# Patient Record
Sex: MALE | Race: WHITE | Employment: OTHER | URBAN - METROPOLITAN AREA
[De-identification: names, ages, dates, MRNs, and addresses within clinical notes are randomized per-mention and may not be internally consistent; named-entity substitution may affect disease eponyms.]

---

## 2019-11-21 ENCOUNTER — OFFICE VISIT (OUTPATIENT)
Dept: URGENT CARE | Facility: URGENT CARE | Age: 74
End: 2019-11-21
Payer: MEDICARE

## 2019-11-21 VITALS
OXYGEN SATURATION: 95 % | TEMPERATURE: 97.7 F | HEIGHT: 71 IN | DIASTOLIC BLOOD PRESSURE: 89 MMHG | BODY MASS INDEX: 33.32 KG/M2 | SYSTOLIC BLOOD PRESSURE: 142 MMHG | WEIGHT: 238 LBS | HEART RATE: 74 BPM

## 2019-11-21 DIAGNOSIS — R42 VERTIGO: Primary | ICD-10-CM

## 2019-11-21 LAB
ANION GAP SERPL CALCULATED.3IONS-SCNC: 3 MMOL/L (ref 3–14)
BUN SERPL-MCNC: 22 MG/DL (ref 7–30)
CALCIUM SERPL-MCNC: 9 MG/DL (ref 8.5–10.1)
CHLORIDE SERPL-SCNC: 110 MMOL/L (ref 94–109)
CO2 SERPL-SCNC: 28 MMOL/L (ref 20–32)
CREAT SERPL-MCNC: 0.73 MG/DL (ref 0.66–1.25)
GFR SERPL CREATININE-BSD FRML MDRD: >90 ML/MIN/{1.73_M2}
GLUCOSE SERPL-MCNC: 90 MG/DL (ref 70–99)
HGB BLD-MCNC: 13.6 G/DL (ref 13.3–17.7)
POTASSIUM SERPL-SCNC: 3.8 MMOL/L (ref 3.4–5.3)
SODIUM SERPL-SCNC: 141 MMOL/L (ref 133–144)
TROPONIN I SERPL-MCNC: <0.015 UG/L (ref 0–0.04)

## 2019-11-21 PROCEDURE — 80048 BASIC METABOLIC PNL TOTAL CA: CPT | Performed by: FAMILY MEDICINE

## 2019-11-21 PROCEDURE — 93000 ELECTROCARDIOGRAM COMPLETE: CPT | Performed by: FAMILY MEDICINE

## 2019-11-21 PROCEDURE — 99204 OFFICE O/P NEW MOD 45 MIN: CPT | Performed by: FAMILY MEDICINE

## 2019-11-21 PROCEDURE — 84484 ASSAY OF TROPONIN QUANT: CPT | Performed by: FAMILY MEDICINE

## 2019-11-21 PROCEDURE — 36415 COLL VENOUS BLD VENIPUNCTURE: CPT | Performed by: FAMILY MEDICINE

## 2019-11-21 PROCEDURE — 85018 HEMOGLOBIN: CPT | Performed by: FAMILY MEDICINE

## 2019-11-21 RX ORDER — MECLIZINE HYDROCHLORIDE 25 MG/1
25 TABLET ORAL 3 TIMES DAILY PRN
Qty: 21 TABLET | Refills: 0 | Status: SHIPPED | OUTPATIENT
Start: 2019-11-21 | End: 2019-11-28

## 2019-11-21 RX ORDER — VALSARTAN AND HYDROCHLOROTHIAZIDE 80; 12.5 MG/1; MG/1
1 TABLET, FILM COATED ORAL DAILY
COMMUNITY
Start: 2019-08-06

## 2019-11-21 ASSESSMENT — MIFFLIN-ST. JEOR: SCORE: 1851.69

## 2019-11-21 NOTE — PROGRESS NOTES
"SUBJECTIVE: Harman Del Castillo is a 74 year old male presenting with a chief complaint of Dizziness/room spinning.  Onset of symptoms was 3 day(s) ago.  Course of illness is worsening.    Severity moderate  Current and Associated symptoms: none  Treatment measures tried include Meclizine 25mg 1/2 tab with each of the 3 episodes the dizziness had occured.  Predisposing factors include none.    Past Medical History:   Diagnosis Date     Hypertension        No past surgical history on file.    Family History   Problem Relation Age of Onset     Cerebrovascular Disease Father      Heart Disease Father        Social History     Tobacco Use     Smoking status: Never Smoker     Smokeless tobacco: Never Used   Substance Use Topics     Alcohol use: Not on file      Allergies no known allergies    Review Of Systems  Skin: negative  Eyes: negative  Ears/Nose/Throat: negative  Respiratory: No shortness of breath, dyspnea on exertion, cough, or hemoptysis  Cardiovascular: negative  Gastrointestinal: negative  Genitourinary: negative  Musculoskeletal: negative  Neurologic: negative for and headaches  Psychiatric: negative    OBJECTIVE:  BP (!) 142/89   Pulse 74   Temp 97.7  F (36.5  C) (Oral)   Ht 1.803 m (5' 11\")   Wt 108 kg (238 lb)   SpO2 95%   BMI 33.19 kg/m     GENERAL APPEARANCE: healthy, alert and no distress  EYES: EOMI,  PERRL, conjunctiva clear  HENT: ear canals and TM's normal.  Nose and mouth without ulcers, erythema or lesions  NECK: supple, nontender, no lymphadenopathy  RESP: lungs clear to auscultation - no rales, rhonchi or wheezes  CV: regular rates and rhythm, normal S1 S2, no murmur noted  ABDOMEN:  soft, nontender, no HSM or masses and bowel sounds normal  NEURO: Normal strength and tone, sensory exam grossly normal,  normal speech and mentation  SKIN: no suspicious lesions or rashes    EKG NSR without acute st changes      ICD-10-CM    1. Vertigo R42 Basic metabolic panel     Troponin I     Hemoglobin     " EKG 12-lead complete w/read - Clinics     meclizine (ANTIVERT) 25 MG tablet     No MI by normal Troponin and EKG

## 2019-11-22 ENCOUNTER — HOSPITAL ENCOUNTER (OUTPATIENT)
Facility: CLINIC | Age: 74
Setting detail: OBSERVATION
Discharge: HOME | End: 2019-11-23
Attending: EMERGENCY MEDICINE | Admitting: HOSPITALIST
Payer: MEDICARE

## 2019-11-22 ENCOUNTER — NURSE TRIAGE (OUTPATIENT)
Dept: NURSING | Facility: CLINIC | Age: 74
End: 2019-11-22

## 2019-11-22 ENCOUNTER — APPOINTMENT (OUTPATIENT)
Dept: MRI IMAGING | Facility: CLINIC | Age: 74
End: 2019-11-22
Attending: EMERGENCY MEDICINE
Payer: MEDICARE

## 2019-11-22 ENCOUNTER — TELEPHONE (OUTPATIENT)
Dept: URGENT CARE | Facility: URGENT CARE | Age: 74
End: 2019-11-22

## 2019-11-22 DIAGNOSIS — R42 VERTIGO: ICD-10-CM

## 2019-11-22 LAB
ANION GAP SERPL CALCULATED.3IONS-SCNC: 9 MMOL/L (ref 3–14)
BASOPHILS # BLD AUTO: 0 10E9/L (ref 0–0.2)
BASOPHILS NFR BLD AUTO: 0.1 %
BUN SERPL-MCNC: 21 MG/DL (ref 7–30)
CALCIUM SERPL-MCNC: 8.4 MG/DL (ref 8.5–10.1)
CHLORIDE SERPL-SCNC: 106 MMOL/L (ref 94–109)
CO2 SERPL-SCNC: 24 MMOL/L (ref 20–32)
CREAT SERPL-MCNC: 0.59 MG/DL (ref 0.66–1.25)
DIFFERENTIAL METHOD BLD: ABNORMAL
EOSINOPHIL # BLD AUTO: 0 10E9/L (ref 0–0.7)
EOSINOPHIL NFR BLD AUTO: 0.1 %
ERYTHROCYTE [DISTWIDTH] IN BLOOD BY AUTOMATED COUNT: 13.8 % (ref 10–15)
GFR SERPL CREATININE-BSD FRML MDRD: >90 ML/MIN/{1.73_M2}
GLUCOSE SERPL-MCNC: 207 MG/DL (ref 70–99)
HBA1C MFR BLD: 5.4 % (ref 0–5.6)
HCT VFR BLD AUTO: 41.6 % (ref 40–53)
HGB BLD-MCNC: 13.9 G/DL (ref 13.3–17.7)
IMM GRANULOCYTES # BLD: 0 10E9/L (ref 0–0.4)
IMM GRANULOCYTES NFR BLD: 0.4 %
LYMPHOCYTES # BLD AUTO: 0.4 10E9/L (ref 0.8–5.3)
LYMPHOCYTES NFR BLD AUTO: 3.8 %
MAGNESIUM SERPL-MCNC: 2 MG/DL (ref 1.6–2.3)
MCH RBC QN AUTO: 31.4 PG (ref 26.5–33)
MCHC RBC AUTO-ENTMCNC: 33.4 G/DL (ref 31.5–36.5)
MCV RBC AUTO: 94 FL (ref 78–100)
MONOCYTES # BLD AUTO: 0.4 10E9/L (ref 0–1.3)
MONOCYTES NFR BLD AUTO: 3.8 %
NEUTROPHILS # BLD AUTO: 9.6 10E9/L (ref 1.6–8.3)
NEUTROPHILS NFR BLD AUTO: 91.8 %
NRBC # BLD AUTO: 0 10*3/UL
NRBC BLD AUTO-RTO: 0 /100
PHOSPHATE SERPL-MCNC: 2.5 MG/DL (ref 2.5–4.5)
PLATELET # BLD AUTO: 307 10E9/L (ref 150–450)
POTASSIUM SERPL-SCNC: 3.6 MMOL/L (ref 3.4–5.3)
RBC # BLD AUTO: 4.43 10E12/L (ref 4.4–5.9)
SODIUM SERPL-SCNC: 139 MMOL/L (ref 133–144)
TROPONIN I SERPL-MCNC: <0.015 UG/L (ref 0–0.04)
TROPONIN I SERPL-MCNC: <0.015 UG/L (ref 0–0.04)
WBC # BLD AUTO: 10.5 10E9/L (ref 4–11)

## 2019-11-22 PROCEDURE — 99285 EMERGENCY DEPT VISIT HI MDM: CPT | Mod: 25

## 2019-11-22 PROCEDURE — 83735 ASSAY OF MAGNESIUM: CPT | Performed by: EMERGENCY MEDICINE

## 2019-11-22 PROCEDURE — 96361 HYDRATE IV INFUSION ADD-ON: CPT

## 2019-11-22 PROCEDURE — A9585 GADOBUTROL INJECTION: HCPCS | Performed by: EMERGENCY MEDICINE

## 2019-11-22 PROCEDURE — 25800030 ZZH RX IP 258 OP 636: Performed by: EMERGENCY MEDICINE

## 2019-11-22 PROCEDURE — 36415 COLL VENOUS BLD VENIPUNCTURE: CPT | Performed by: HOSPITALIST

## 2019-11-22 PROCEDURE — 84484 ASSAY OF TROPONIN QUANT: CPT | Performed by: HOSPITALIST

## 2019-11-22 PROCEDURE — 70553 MRI BRAIN STEM W/O & W/DYE: CPT

## 2019-11-22 PROCEDURE — 84484 ASSAY OF TROPONIN QUANT: CPT | Performed by: EMERGENCY MEDICINE

## 2019-11-22 PROCEDURE — 83036 HEMOGLOBIN GLYCOSYLATED A1C: CPT | Performed by: EMERGENCY MEDICINE

## 2019-11-22 PROCEDURE — 84100 ASSAY OF PHOSPHORUS: CPT | Performed by: EMERGENCY MEDICINE

## 2019-11-22 PROCEDURE — 25000128 H RX IP 250 OP 636: Performed by: EMERGENCY MEDICINE

## 2019-11-22 PROCEDURE — 25800030 ZZH RX IP 258 OP 636: Performed by: HOSPITALIST

## 2019-11-22 PROCEDURE — 80048 BASIC METABOLIC PNL TOTAL CA: CPT | Performed by: EMERGENCY MEDICINE

## 2019-11-22 PROCEDURE — 25000132 ZZH RX MED GY IP 250 OP 250 PS 637: Mod: GY | Performed by: EMERGENCY MEDICINE

## 2019-11-22 PROCEDURE — 96374 THER/PROPH/DIAG INJ IV PUSH: CPT

## 2019-11-22 PROCEDURE — 99220 ZZC INITIAL OBSERVATION CARE,LEVL III: CPT | Performed by: HOSPITALIST

## 2019-11-22 PROCEDURE — 25500064 ZZH RX 255 OP 636: Performed by: EMERGENCY MEDICINE

## 2019-11-22 PROCEDURE — G0378 HOSPITAL OBSERVATION PER HR: HCPCS

## 2019-11-22 PROCEDURE — 85025 COMPLETE CBC W/AUTO DIFF WBC: CPT | Performed by: EMERGENCY MEDICINE

## 2019-11-22 RX ORDER — ONDANSETRON 2 MG/ML
4 INJECTION INTRAMUSCULAR; INTRAVENOUS EVERY 6 HOURS PRN
Status: DISCONTINUED | OUTPATIENT
Start: 2019-11-22 | End: 2019-11-23 | Stop reason: HOSPADM

## 2019-11-22 RX ORDER — ONDANSETRON 4 MG/1
4 TABLET, ORALLY DISINTEGRATING ORAL EVERY 6 HOURS PRN
Status: DISCONTINUED | OUTPATIENT
Start: 2019-11-22 | End: 2019-11-23 | Stop reason: HOSPADM

## 2019-11-22 RX ORDER — SODIUM CHLORIDE 9 MG/ML
INJECTION, SOLUTION INTRAVENOUS CONTINUOUS
Status: DISCONTINUED | OUTPATIENT
Start: 2019-11-22 | End: 2019-11-23

## 2019-11-22 RX ORDER — GADOBUTROL 604.72 MG/ML
11 INJECTION INTRAVENOUS ONCE
Status: COMPLETED | OUTPATIENT
Start: 2019-11-22 | End: 2019-11-22

## 2019-11-22 RX ORDER — ACETAMINOPHEN 325 MG/1
650 TABLET ORAL EVERY 4 HOURS PRN
Status: DISCONTINUED | OUTPATIENT
Start: 2019-11-22 | End: 2019-11-23 | Stop reason: HOSPADM

## 2019-11-22 RX ORDER — MECLIZINE HYDROCHLORIDE 25 MG/1
25 TABLET ORAL ONCE
Status: COMPLETED | OUTPATIENT
Start: 2019-11-22 | End: 2019-11-22

## 2019-11-22 RX ORDER — ONDANSETRON 2 MG/ML
4 INJECTION INTRAMUSCULAR; INTRAVENOUS ONCE
Status: COMPLETED | OUTPATIENT
Start: 2019-11-22 | End: 2019-11-22

## 2019-11-22 RX ORDER — LOSARTAN POTASSIUM 25 MG/1
25 TABLET ORAL DAILY
Status: DISCONTINUED | OUTPATIENT
Start: 2019-11-23 | End: 2019-11-23 | Stop reason: HOSPADM

## 2019-11-22 RX ORDER — NALOXONE HYDROCHLORIDE 0.4 MG/ML
.1-.4 INJECTION, SOLUTION INTRAMUSCULAR; INTRAVENOUS; SUBCUTANEOUS
Status: DISCONTINUED | OUTPATIENT
Start: 2019-11-22 | End: 2019-11-23 | Stop reason: HOSPADM

## 2019-11-22 RX ORDER — ACETAMINOPHEN 650 MG/1
650 SUPPOSITORY RECTAL EVERY 4 HOURS PRN
Status: DISCONTINUED | OUTPATIENT
Start: 2019-11-22 | End: 2019-11-23 | Stop reason: HOSPADM

## 2019-11-22 RX ORDER — MECLIZINE HCL 12.5 MG 12.5 MG/1
12.5 TABLET ORAL 3 TIMES DAILY PRN
Status: DISCONTINUED | OUTPATIENT
Start: 2019-11-22 | End: 2019-11-23 | Stop reason: HOSPADM

## 2019-11-22 RX ORDER — DIAZEPAM 5 MG
5 TABLET ORAL ONCE
Status: COMPLETED | OUTPATIENT
Start: 2019-11-22 | End: 2019-11-22

## 2019-11-22 RX ORDER — LACTOBACILLUS RHAMNOSUS GG 10B CELL
1 CAPSULE ORAL 2 TIMES DAILY
COMMUNITY

## 2019-11-22 RX ADMIN — SODIUM CHLORIDE: 9 INJECTION, SOLUTION INTRAVENOUS at 23:20

## 2019-11-22 RX ADMIN — MECLIZINE HYDROCHLORIDE 25 MG: 25 TABLET ORAL at 20:49

## 2019-11-22 RX ADMIN — SODIUM CHLORIDE 1000 ML: 9 INJECTION, SOLUTION INTRAVENOUS at 16:45

## 2019-11-22 RX ADMIN — GADOBUTROL 11 ML: 604.72 INJECTION INTRAVENOUS at 18:49

## 2019-11-22 RX ADMIN — DIAZEPAM 5 MG: 5 TABLET ORAL at 16:45

## 2019-11-22 RX ADMIN — ONDANSETRON 4 MG: 2 INJECTION INTRAMUSCULAR; INTRAVENOUS at 16:45

## 2019-11-22 ASSESSMENT — ENCOUNTER SYMPTOMS
HEADACHES: 0
COUGH: 0
FEVER: 0
DIAPHORESIS: 1
SHORTNESS OF BREATH: 0
RHINORRHEA: 0
VOMITING: 0
NUMBNESS: 0
PALPITATIONS: 0
CHILLS: 0
DIZZINESS: 1
NAUSEA: 1

## 2019-11-22 ASSESSMENT — MIFFLIN-ST. JEOR: SCORE: 1845.34

## 2019-11-22 NOTE — TELEPHONE ENCOUNTER
"Patient states he was seen at  yesterday and prescribed medication for vertigo.  States symptoms worsened last night and \"today is the worse day of all\".  Offered to check with scheduling if there was a same day appointment or could go to .  Patient states due to instability caused by the vertigo, he would be unable to stand and make it into a car.  FNA suggested calling 911 for transport to ED.      "

## 2019-11-22 NOTE — ED TRIAGE NOTES
Patient states he has been feeling dizzy since Monday night.  Went to urgent care yesterday, given meclizine States no improvement.

## 2019-11-22 NOTE — TELEPHONE ENCOUNTER
Reason for Call:  Other call back    Detailed comments: Patient spouse Leticia is calling she states patient was seen in urgent care and symptoms are not getting better they are getting worse. Leticia would like a call back to be advise. Please follow up.     Phone Number Patient can be reached at: Cell number on file:    Telephone Information:   Mobile [phone]       Best Time: anytime     Can we leave a detailed message on this number? YES    Call taken on 11/22/2019 at 9:33 AM by Ellen Trevizo

## 2019-11-22 NOTE — ED NOTES
Bed: ED02  Expected date: 11/22/19  Expected time: 4:00 PM  Means of arrival: Ambulance  Comments:  533 74m vertigo ETA 1600

## 2019-11-22 NOTE — ED PROVIDER NOTES
"  History     Chief Complaint:  Dizziness      The history is provided by the patient.      Harman Del Castillo is a 74 year old male with a history of hypertension who presents via EMS with dizziness he describes as a room spinning sensation. He first experienced an episode of this three days ago. It resolved spontaneously and he was able to sleep and the next day took a plane from [place] to [place]. Since arrival here in [place] he has had 2-3 more episodes each self resolving. He went to Urgent Care yesterday where he had a reassuring work up and discharged with meclizine (last dose 2.5 hours ago). When he awoke this morning he was in his baseline state of health but this morning became dizzy with the sensation unrelenting.     He called Urgent Care today with the report his dizziness was worsening even with the meclizine and they recommended he be seen. He describes \"head cloudiness\" noting that he is able to look at close objects without issue but stationary objects far away look as though they are moving around. He endorses diaphoresis and nausea without vomiting during the episodes and says his mouth feels dry right now. He has no history of vertigo.     He denies chest pain, palpitations, shortness of breath, or lightheadedness. He denies headache, cough, cold, rhinorrhea, hearing loss, or tinnitus. He denies word finding difficulty or new onset numbness or tingling. He has had difficulty ambulating due to his vertigo.    Allergies:  NKDA     Medications:    Antivert  Diovan     Past Medical History:    HTN  Peyronie's disease  Hearing loss  Senile nuclear cataract     Past Surgical History:    Revise carpal tunnel     Family History:    CVD  Heart disease    Social History:  Smoking status: no   Alcohol use: yes   Lives in [place].  Presents to the ED with his wife.   Marital Status:        Review of Systems   Constitutional: Positive for diaphoresis. Negative for chills and fever.   HENT: " "Negative for congestion, ear pain, hearing loss, rhinorrhea and tinnitus.    Eyes: Positive for visual disturbance.   Respiratory: Negative for cough and shortness of breath.    Cardiovascular: Negative for chest pain and palpitations.   Gastrointestinal: Positive for nausea. Negative for vomiting.   Musculoskeletal: Positive for gait problem.   Neurological: Positive for dizziness. Negative for speech difficulty, weakness, light-headedness, numbness and headaches.   All other systems reviewed and are negative.    Physical Exam     Patient Vitals for the past 24 hrs:   BP Temp Temp src Pulse Resp SpO2 Height Weight   11/22/19 2114 (!) 162/85 97.4  F (36.3  C) Oral 73 18 98 % 1.803 m (5' 11\") 107.3 kg (236 lb 9.6 oz)   11/22/19 2046 (!) 163/99 -- -- 73 -- 96 % -- --   11/22/19 1822 -- -- -- -- -- 97 % -- --   11/22/19 1818 138/81 -- -- 67 -- -- -- --   11/22/19 1613 (!) 185/105 98.4  F (36.9  C) Oral 90 18 97 % -- 107 kg (236 lb)        Physical Exam  General: Well-developed and well-nourished. Well appearing elderly  man. Cooperative.  Head:  Atraumatic.  Eyes:  Conjunctivae, lids, and sclerae are normal.    ENT:    Normal nose. Moist mucous membranes. TMs clear bilaterally.  Neck:  Supple. Normal range of motion.  CV:  Regular rate and rhythm. Normal heart sounds with no murmurs, rubs, or gallops detected.  Resp:  No respiratory distress. Clear to auscultation bilaterally without decreased breath sounds, wheezing, rales, or rhonchi.  GI:  Soft. Non-distended. Non-tender.    MS:  Normal ROM. No bilateral lower extremity edema.  Skin:  Warm. Non-diaphoretic. No pallor.  Neuro: Awake. A&Ox3.     Strength 5/5 bilateral upper and lower extremities.    No pronator drift.    Sensation intact to light touch.    No facial droop. No dysarthria.    No aphasia.    PERRL. Questionable horizontal nystagmus.    No visual field deficits.    No dysmetria.    No dysdiadochokinesis.  Psych: Normal mood and affect. Normal " speech.  Vitals reviewed.    Emergency Department Course     Imaging:  Radiographic findings were communicated with the patient who voiced understanding of the findings.    MR Brain w/o & w Contrast  1. No specific etiology of vertigo is identified.  2. Mild brain atrophy and mild white matter changes consistent with  sequelae of small vessel ischemic disease in the cerebral hemispheres.  3. Opacified left maxillary sinus.  4. Small nodule in the right temporalis muscle consistent with a  hemangioma, but not completely specific as to etiology.  As read by Radiology.    Laboratory:  BMP: Glucose 207 (H), creatinine 0.59 (L), calcium 8.4 (L), o/w WNL   CBC: WBC 10.5, HGB 13.9,      Interventions:  1645: NS 1L IV Bolus   1645: Valium 5 mg PO  1645: Zofran 4 mg IV  2049: Antivert 25 mg PO    Emergency Department Course:  1620: Nursing notes and vitals reviewed. I performed an exam of the patient as documented above.     IV inserted. Medicine administered as documented above. Blood drawn. This was sent to the lab for further testing, results above.    The patient was sent for a brain MRI while in the emergency department, findings above.     1935: I rechecked the patient and discussed the results of his workup thus far. He says he is continuing to feel very dizzy and states that when he transferred from the chair to the bed, he thought he was going to fall over. He is amenable to admission.     1950: I consulted with Dr. Encarnacion of the hospitalist services. She is in agreement to accept the patient for admission.    Findings and plan explained to the patient who consents to admission. Discussed the patient with Dr. Encarnacion who will admit the patient to an observation bed for further monitoring, evaluation, and treatment.    Impression & Plan      Medical Decision Making:  Harman is a 74-year-old man who has had intermittent vertigo over the last several days.  He was seen at urgent care yesterday with a  reassuring work-up and discharged with meclizine.  He presents today because symptoms are not improved with meclizine.  He describes a room spinning sensation and denies chest pain, shortness breath, palpitations, or lightheadedness and this is unlikely to be cardiac in etiology.  Laboratory studies are significant for hyperglycemia to 207 without a history of diabetes but no evidence of DKA.  Because he is having worsening/persistence of symptoms despite meclizine, I was concerned for a CVA.  However, it should be noted that his physical exam is unremarkable without focal neurologic deficits and, fortunately, MRI reveals no acute intracranial pathology to explain his vertigo including no CVA or masses.  He does have opacification of the left maxillary sinus and this in combination with recent flight are likely exacerbating his symptoms although BPPV is favored.  There is no evidence of vestibular neuritis and steroids are not indicated.  He was treated with Zofran, Valium, and IV fluids and had transient improvement in his symptoms, but when he returned from MRI, he had significant vertigo such that he did not even feel comfortable with a trial of ambulation as he was pretty certain he would fall.  I will give him meclizine. The patient understands he will require admission as he cannot safely ambulate due to persistent, intractable vertigo.  I discussed the results of laboratory and imaging studies with him and his family and answered all their questions.  They verbalized understanding and are amenable to plan for admission.  I discussed with case with Dr. Encarnacion, hospitalist, who accepts admission and has no further orders.      Diagnosis:    ICD-10-CM    1. Vertigo R42 Hemoglobin A1c       Disposition:  Admitted to Summer Soto, am serving as a scribe at 4:20 PM on 11/22/2019 to document services personally performed by Cammie Davalos MD based on my observations and the provider's statements  to me.       Summer Rehman  11/22/2019    EMERGENCY DEPARTMENT       Cammie Davalos MD  11/23/19 [id number]

## 2019-11-23 ENCOUNTER — APPOINTMENT (OUTPATIENT)
Dept: PHYSICAL THERAPY | Facility: CLINIC | Age: 74
End: 2019-11-23
Attending: HOSPITALIST
Payer: MEDICARE

## 2019-11-23 ENCOUNTER — APPOINTMENT (OUTPATIENT)
Dept: ULTRASOUND IMAGING | Facility: CLINIC | Age: 74
End: 2019-11-23
Attending: HOSPITALIST
Payer: MEDICARE

## 2019-11-23 ENCOUNTER — APPOINTMENT (OUTPATIENT)
Dept: CARDIOLOGY | Facility: CLINIC | Age: 74
End: 2019-11-23
Attending: HOSPITALIST
Payer: MEDICARE

## 2019-11-23 VITALS
WEIGHT: 236.6 LBS | DIASTOLIC BLOOD PRESSURE: 80 MMHG | TEMPERATURE: 98.2 F | SYSTOLIC BLOOD PRESSURE: 170 MMHG | RESPIRATION RATE: 16 BRPM | HEIGHT: 71 IN | HEART RATE: 71 BPM | OXYGEN SATURATION: 94 % | BODY MASS INDEX: 33.12 KG/M2

## 2019-11-23 LAB
ALBUMIN SERPL-MCNC: 2.8 G/DL (ref 3.4–5)
ALP SERPL-CCNC: 88 U/L (ref 40–150)
ALT SERPL W P-5'-P-CCNC: 21 U/L (ref 0–70)
AST SERPL W P-5'-P-CCNC: 17 U/L (ref 0–45)
BILIRUB DIRECT SERPL-MCNC: <0.1 MG/DL (ref 0–0.2)
BILIRUB SERPL-MCNC: 0.4 MG/DL (ref 0.2–1.3)
CK SERPL-CCNC: 43 U/L (ref 30–300)
PROT SERPL-MCNC: 5.9 G/DL (ref 6.8–8.8)
TROPONIN I SERPL-MCNC: <0.015 UG/L (ref 0–0.04)
TSH SERPL DL<=0.005 MIU/L-ACNC: 0.86 MU/L (ref 0.4–4)

## 2019-11-23 PROCEDURE — 84443 ASSAY THYROID STIM HORMONE: CPT | Performed by: HOSPITALIST

## 2019-11-23 PROCEDURE — 93306 TTE W/DOPPLER COMPLETE: CPT

## 2019-11-23 PROCEDURE — G0378 HOSPITAL OBSERVATION PER HR: HCPCS

## 2019-11-23 PROCEDURE — 97161 PT EVAL LOW COMPLEX 20 MIN: CPT | Mod: GP

## 2019-11-23 PROCEDURE — 84484 ASSAY OF TROPONIN QUANT: CPT | Performed by: HOSPITALIST

## 2019-11-23 PROCEDURE — 82550 ASSAY OF CK (CPK): CPT | Performed by: HOSPITALIST

## 2019-11-23 PROCEDURE — 25000132 ZZH RX MED GY IP 250 OP 250 PS 637: Mod: GY | Performed by: HOSPITALIST

## 2019-11-23 PROCEDURE — 80076 HEPATIC FUNCTION PANEL: CPT | Performed by: HOSPITALIST

## 2019-11-23 PROCEDURE — 93880 EXTRACRANIAL BILAT STUDY: CPT

## 2019-11-23 PROCEDURE — 96361 HYDRATE IV INFUSION ADD-ON: CPT

## 2019-11-23 PROCEDURE — 99217 ZZC OBSERVATION CARE DISCHARGE: CPT | Performed by: HOSPITALIST

## 2019-11-23 PROCEDURE — 36415 COLL VENOUS BLD VENIPUNCTURE: CPT | Performed by: HOSPITALIST

## 2019-11-23 PROCEDURE — 93306 TTE W/DOPPLER COMPLETE: CPT | Mod: 26 | Performed by: INTERNAL MEDICINE

## 2019-11-23 RX ORDER — ONDANSETRON 4 MG/1
4 TABLET, ORALLY DISINTEGRATING ORAL EVERY 8 HOURS PRN
Qty: 10 TABLET | Refills: 0 | Status: SHIPPED | OUTPATIENT
Start: 2019-11-23

## 2019-11-23 RX ADMIN — MECLIZINE 12.5 MG: 12.5 TABLET ORAL at 08:14

## 2019-11-23 RX ADMIN — LOSARTAN POTASSIUM 25 MG: 25 TABLET ORAL at 08:07

## 2019-11-23 ASSESSMENT — ENCOUNTER SYMPTOMS
WEAKNESS: 0
LIGHT-HEADEDNESS: 0
SPEECH DIFFICULTY: 0

## 2019-11-23 NOTE — PLAN OF CARE
Patient came in for dizziness. A&Ox4. Assist 1+, dizziness reported when sitting/standing at bedside, pt. States improvement in dizziness since MRI. VSS ex high BP 170s/80s. Moderate edema in LE, RLE cooler than LLE. IV fluids held and MD paged. Tele: NSR. Voiding adequately to bedside urinal. Skin intact. Discharge pending resolution of symptoms.     Addendum: Fluids started due to possible 3rd-spacing

## 2019-11-23 NOTE — PLAN OF CARE
PT:  Discharge Planner PT   Patient plan for discharge: Return to his daughter's house  Current status: Orders received, eval completed. Pt admitted under observation status with intermittent dizziness for a few days which became constant and hyperglycemia. Prior to admit pt was staying at his daughter's house with his wife. They are here for the Thanksgiving holiday and will fly home to [place] after that. Daughter's house is a 4 level split level home. Pt does not use an AD and is independent with mobility at baseline, active. Currently pt denies any dizziness since he woke up this morning and it resolved quickly. Pt ambulated 250 ft with no AD SBA progressing to independent with steady balance, sit to/from stand independent, ascended and descended 4 stairs with rail with steady balance. Pt denied dizziness with any mobility. Due to no symptoms at time of PT session, vestibular evaluation not completed to avoid any risk of creating dizziness.  Barriers to return to prior living situation: None  Recommendations for discharge: Return to his daughter's house  Rationale for recommendations: Discussed with pt and wife that if his symptoms return to get an order for vestibular therapy for an assessment and both in agreement. Pt has no further skilled PT needs at this time.       Entered by: Hayley Garsia 11/23/2019 2:31 PM

## 2019-11-23 NOTE — ED NOTES
"[facility]  ED Nurse Handoff Report    ED Chief complaint: Dizziness      ED Diagnosis:   Final diagnoses:   Vertigo       Code Status: Full Code    Allergies: No Known Allergies    Activity level - Baseline/Home:  Independent  Activity Level - Current:   Stand with Assist    Patient's Preferred language: English   Needed?: No    Isolation: No  Infection: Not Applicable  Bariatric?: No    Vital Signs:   Vitals:    11/22/19 1613 11/22/19 1818 11/22/19 1822   BP: (!) 185/105 138/81    Pulse: 90 67    Resp: 18     Temp: 98.4  F (36.9  C)     TempSrc: Oral     SpO2: 97%  97%   Weight: 107 kg (236 lb)         Cardiac Rhythm: ,        Pain level:      Is this patient confused?: No   Does this patient have a guardian?  No         If yes, is there guardianship documents in the Epic \"Code/ACP\" activity?  N/A         Guardian Notified?  N/A  [place] - Suicide Severity Rating Scale Completed?  Yes  If yes, what color did the patient score?  White    Patient Report: Initial Complaint: Pt. Complains of dizziness since Monday. EMS brings pt. In.  Focused Assessment: Pt. States the room is spinning and is unable to stand or walk. Denies chest pain or dyspnea.  Tests Performed: labs  Abnormal Results:   Results for orders placed or performed during the hospital encounter of 11/22/19   Basic metabolic panel     Status: Abnormal   Result Value Ref Range    Sodium 139 133 - 144 mmol/L    Potassium 3.6 3.4 - 5.3 mmol/L    Chloride 106 94 - 109 mmol/L    Carbon Dioxide 24 20 - 32 mmol/L    Anion Gap 9 3 - 14 mmol/L    Glucose 207 (H) 70 - 99 mg/dL    Urea Nitrogen 21 7 - 30 mg/dL    Creatinine 0.59 (L) 0.66 - 1.25 mg/dL    GFR Estimate >90 >60 mL/min/[1.73_m2]    GFR Estimate If Black >90 >60 mL/min/[1.73_m2]    Calcium 8.4 (L) 8.5 - 10.1 mg/dL   CBC with platelets differential     Status: Abnormal   Result Value Ref Range    WBC 10.5 4.0 - 11.0 10e9/L    RBC Count 4.43 4.4 - 5.9 10e12/L    Hemoglobin 13.9 " 13.3 - 17.7 g/dL    Hematocrit 41.6 40.0 - 53.0 %    MCV 94 78 - 100 fl    MCH 31.4 26.5 - 33.0 pg    MCHC 33.4 31.5 - 36.5 g/dL    RDW 13.8 10.0 - 15.0 %    Platelet Count 307 150 - 450 10e9/L    Diff Method Automated Method     % Neutrophils 91.8 %    % Lymphocytes 3.8 %    % Monocytes 3.8 %    % Eosinophils 0.1 %    % Basophils 0.1 %    % Immature Granulocytes 0.4 %    Nucleated RBCs 0 0 /100    Absolute Neutrophil 9.6 (H) 1.6 - 8.3 10e9/L    Absolute Lymphocytes 0.4 (L) 0.8 - 5.3 10e9/L    Absolute Monocytes 0.4 0.0 - 1.3 10e9/L    Absolute Eosinophils 0.0 0.0 - 0.7 10e9/L    Absolute Basophils 0.0 0.0 - 0.2 10e9/L    Abs Immature Granulocytes 0.0 0 - 0.4 10e9/L    Absolute Nucleated RBC 0.0        Treatments provided: Valium, zofran    Family Comments: wife at bedside  OBS brochure/video discussed/provided to patient/family: YES              Name of person given brochure if not patient: NA              Relationship to patient: NA    ED Medications:   Medications   meclizine (ANTIVERT) tablet 25 mg (has no administration in time range)   0.9% sodium chloride BOLUS (0 mLs Intravenous Stopped 11/22/19 1851)   diazepam (VALIUM) tablet 5 mg (5 mg Oral Given 11/22/19 1645)   ondansetron (ZOFRAN) injection 4 mg (4 mg Intravenous Given 11/22/19 1645)   gadobutrol (GADAVIST) injection 11 mL (11 mLs Intravenous Given 11/22/19 1849)       Drips infusing?:  No    For the majority of the shift this patient was Green.   Interventions performed were Updated on plan of care.    Severe Sepsis OR Septic Shock Diagnosis Present: No    To be done/followed up on inpatient unit:  Continue to monitor    ED NURSE PHONE NUMBER: [phone]

## 2019-11-23 NOTE — H&P
[facility]    History and Physical  Hospitalist    Harman Del Castillo MRN# [id number]   Age: 74 year old YOB: 1945     Date of Admission:  11/22/2019    Primary care provider: Kellen Ref-Primary, Physician          Assessment and Plan:     Harman Del Castillo is a 74 year old  male with medical history of hypertension, sensorineural hearing loss, senile cataract, history of chronic left shoulder pain due to nerve impingement, right carpal tunnel syndrome, KWABENA on CPAP, hypertension brought into the ED via EMS for room spinning dizziness.    Dizziness  Patient reporting dizziness on and off for the last 3 days.  Traveling from [place] to [place].  Feels like it is cloudy, feels like for objects or moving around.  Feels room is spinning intermittently.  In ED blood pressure 185/105 that improved to 138/81 without any antihypertensive regimen.  No focal weakness.  MRI brain no specific etiology of vertigo identified.  Glucose 207, creatinine 0.59, calcium 814.  WBC 10.5, hemoglobin 13.9, platelets 307.  Received IV fluid bolus, Valium, Zofran in the ED.  IV fluids with normal saline at 100 mL/h for 10 hours.  Orthostatic vital signs.  We will check TSH, CPK, magnesium and phosphorus level, UA   Trend troponin.  Telemetry monitoring overnight.  Ultrasound carotids.  Echocardiogram for evaluation of heart function.  PRN meclizine.  PRN Zofran.  Hold PTA diuretic.  Physical therapy evaluation.  Fall precaution.    Hyperglycemia.  Blood sugar on admission 207.  Will check hemoglobin A1c.    History of severe to profound high-frequency sensorineural hearing loss.  Has hearing aid and follows up with audiology team in [place].  Unsure of room spinning sensation associated with same.  If above work-up negative will need to follow-up with the ENT once back home.    Senile nuclear cataract.  Patient complained of blurred vision this morning on and off associated with dizziness, MRI no acute  stroke.  Follow-up with ophthalmology as outpatient.    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  No acute issues.    Hypertension.  Hold PTA valsartan hydrochlorothiazide.  Started on losartan 25 mg oral daily.  Monitor blood pressures.    History of right carpal tunnel syndrome.  Follows up with neurology, orthopedic team in [place].  Underwent right carpal tunnel release on 11/7.  No acute issues.    Incidental MRI finding.  MRI with opacified left maxillary sinus.  No sinus tenderness.  No symptoms of cough or cold.  No leukocytosis.  Afebrile.  No need for antibiotics at this time.   Also noted on MRI small nodule in the right temporalis muscle consistent with a hemangioma, but not completely specific as to etiology.  Follow up as outpatient.    Peyronie's disease  No acute issues.    Erectile dysfunction.  No acute issues.    Obesity with a BMI of 32.92.  We will need to consider lifestyle modification with diet and exercise as able to.    Obstructive sleep apnea on CPAP.  Patient home CPAP unavailable.  As needed CPAP during hospitalization.      DVT Prophylaxis ambulate.  Code Status: Full code, discussed with patient and his wife.    Disposition: Expected discharge in 24 hours pending clinical improvement.  Patient, family, interdisciplinary team involved in care and agrees with plan.    Total time  > 35 minutes. Discussed with patient, his wife and ED team.     Laura Encarnacion MD          Chief Complaint:     History is obtained from patient, his wife by the bedside.    Harman Del Castillo is a 74 year old  male with medical history of hypertension, sensorineural hearing loss, senile cataract, history of chronic left shoulder pain due to nerve impingement, right carpal tunnel syndrome, KWABENA on CPAP, hypertension brought into the ED via EMS for room spinning dizziness.    Per report patient first experienced this symptom 3 days back in the middle of the  night, was unable to fall back asleep.  Had to take a plane from [place] to [place] later that day.  Had an episode of dizziness 2 days prior, another episode yesterday morning prompting him to visit to urgent care.  At urgent care was prescribed meclizine.  Last night had an episode that lasted 15 minutes, was able to fall asleep, woke up this morning and felt dizzy again.  Called urgent care and was reported to come into ED for evaluation.  Feels like head is cloudy noting that he is able to look at objects at a close distance but feels like a far objects are moving around intermittently.  Feels like the room is spinning around.  Feels his mouth is dry, has nausea.  Remote history of vertigo that self resolved.  No chest pain or palpitation.  No headaches.  No hearing difficulty.  No speech difficulty.  No smell difficulty.  No recent cold or cough.  No vomiting,  No tingling or numbness, no new focal weakness.  No incontinence of bowel or bladder.    In ED blood pressure 185/105 that improved to 138/81 without any antihypertensive regimen.  No focal weakness.  MRI brain no specific etiology of vertigo identified.  Glucose 207, creatinine 0.59, calcium 814.  WBC 10.5, hemoglobin 13.9, platelets 307.  Received IV fluid bolus, Valium, Zofran in the ED.           Review of Systems:     GENERAL: no fever or chills  EENT:  no sinus problems, no difficulty swallowing, no hearing difficulty  PULMONARY: No shortness of breath, no cough, no wheezing  CARDIAC: no chest pain, no irregular or fast heart beats   GI: No abdominal pain, nausea, vomiting, diarrhea, constipation, black or bloody stools  : No burning/pain with urination, no urgency, no hematuria.   NEURO: No significant headaches, no slurred speech, no seizures, no loss of consciousness  ENDOCRINE: No excessive thirst, no excessive bruising.  MUSCULOSKELETAL: No new joint pain or swelling.  Chronic bilateral knee arthritis.  Next sKIN: No skin  ruben  PSYCHIATRY no anxiety or depression    Medical History:     Past Medical History:   Diagnosis Date     Hypertension         Surgical History:   No pertinent surgical history.  Reviewed with patient and his wife.         Social History:      Social History     Tobacco Use     Smoking status: Never Smoker     Smokeless tobacco: Never Used   Substance Use Topics     Alcohol use: Not on file             Family History:     Family History   Problem Relation Age of Onset     Cerebrovascular Disease Father      Heart Disease Father              Allergies:   No Known Allergies          Medications:   Home meds reviewed          Physical Exam      Admission Weight: 107 kg (236 lb)    Vital Signs with Ranges  Temp:  [98.4  F (36.9  C)] 98.4  F (36.9  C)  Pulse:  [67-90] 67  Resp:  [18] 18  BP: (138-185)/() 138/81  SpO2:  [97 %] 97 %    PHYSICAL EXAM  GENERAL: Patient is in no distress. Alert and oriented.  HEENT: Oropharynx pink, moist. Pupils equal, reacting to light, no nystagmus.  No neck stiffness.  Extraocular muscles movement intact.  HEART: Regular rate and rhythm. S1S2. No murmurs  LUNGS: Clear to auscultation bilaterally. No expiratory wheeze.  Respirations unlabored  ABDOMEN: Soft, no abdominal tenderness, bowel sounds heard   NEURO: Cranial nerves II-XII intact. Motor and sensory system in normal range  EXTREMITIES: No pedal edema. 2+ peripheral pulses.  SKIN: Warm, dry. No rash or bruising.  PSYCHIATRY Cooperative         Data:   All new lab and imaging data was reviewed.

## 2019-11-23 NOTE — PLAN OF CARE
Discharge education complete, reviewed with patient, patient verbalized understanding. Stated concern about high blood pressure, MD paged and instructions given to follow up with primary and continue current BP med regimen. All belongings sent with patient. Patient to discharge to daughter's home with family.

## 2019-11-23 NOTE — PROVIDER NOTIFICATION
MD Notification    Notified Person: MD    Notified Person Name: Shashank    Notification Date/Time: 11/22/19 9:36 PM    Notification Interaction: Webpage     Purpose of Notification: Patient high BP, edema bilat. LE, RLE cool to touch. Please advise.    Orders Received:    Comments:

## 2019-11-23 NOTE — PLAN OF CARE
A&Ox4, VSS on RA with ex of htn. Orthostatics negative. Denies pain. Up SBA. C/o dizziness/vertigo, given meclizine on request at 8:14am. Reg diet, voiding adequately. IV SL. Carotid US complete. Echo complete, PT seen, recommending home to daughter's house. Plan for SW consult.

## 2019-11-23 NOTE — PLAN OF CARE
AVSS; tele SR; pt denied any dizziness/feelings of vertigo or change in vision overnight; IV NS at 100 ml/hr, will disccontinue about 0715 as per order; up with 1 assist and a gait belt; voiding in good amounts; Echo, Bilateral Carotid ultrasound, and SW/CC/PT consults ordered.

## 2019-11-23 NOTE — PLAN OF CARE
PRIMARY DIAGNOSIS: VERTIGO    OUTPATIENT/OBSERVATION GOALS TO BE MET BEFORE DISCHARGE  1. Orthostatic performed: Yes:          Lying Orthostatic BP: 158/83         Sitting Orthostatic BP: 165/83         Standing Orthostatic BP: 167/90     2. Completion of appropriate imaging: No    3. Tolerating PO medications: Yes    4. Return to near baseline physical activity: No    5. Cleared for discharge by consultants (if involved): No    Discharge Planner Nurse   Safe discharge environment identified: No  Barriers to discharge: Yes       Entered by: Lennie Stoddard 11/23/2019 9:26 AM     Please review provider order for any additional goals.   Nurse to notify provider when observation goals have been met and patient is ready for discharge.

## 2019-11-23 NOTE — PROGRESS NOTES
Observation Goals:    -diagnostic tests and consults completed and resulted - not met  -vital signs normal or at patient baseline - met  -tolerating oral intake to maintain hydration - not met

## 2019-11-23 NOTE — PROGRESS NOTES
11/23/19 1406   Quick Adds   Type of Visit Initial PT Evaluation   Living Environment   Lives With spouse   Living Arrangements house  (one story)   Home Accessibility stairs to enter home   Number of Stairs, Main Entrance 2   Stair Railings, Main Entrance none   Living Environment Comment Pt and wife are here from [place] through [facility] and are staying at their daughter's house which is a 4 level split level home, rails present.   Self-Care   Usual Activity Tolerance good   Current Activity Tolerance fair   Regular Exercise Yes   Activity/Exercise Type walking   Exercise Amount/Frequency daily   Equipment Currently Used at Home none   Functional Level Prior   Ambulation 0-->independent   Transferring 0-->independent   Fall history within last six months no   Which of the above functional risks had a recent onset or change? none   General Information   Onset of Illness/Injury or Date of Surgery - Date 11/22/19   Referring Physician Laura Encarnacion MD   Patient/Family Goals Statement Return to his daughter's home   Pertinent History of Current Problem (include personal factors and/or comorbidities that impact the POC) Pt admitted under observation status with intermittent dizziness and hyperglycemia. Dizziness started earlier this week and would only last for 10 sec-2 min and then would go away and pt described it as spinning. Would happen when laying in bed, sitting in a chair watching TV. No apparent correlation with movement or head turns. Became much worse and constant the day before admit and could not stand or ambulate due to risk of falling over. PMH: HTN, sensorineural hearing loss, chronic L shoulder pain, carpal ascencion, KWABENA, HTN, cataracts.   Precautions/Limitations fall precautions   Weight-Bearing Status - LLE full weight-bearing   Weight-Bearing Status - RLE full weight-bearing   General Observations Spouse present   Cognitive Status Examination   Orientation orientation to person, place  "and time   Level of Consciousness alert   Follows Commands and Answers Questions 100% of the time;able to follow multistep instructions   Personal Safety and Judgment intact   Memory intact   Pain Assessment   Patient Currently in Pain No   Posture    Posture Not impaired   Range of Motion (ROM)   ROM Quick Adds No deficits were identified   Strength   Manual Muscle Testing Quick Adds No deficits were identified   Bed Mobility   Bed Mobility Comments Independent, no dizziness   Transfer Skills   Transfer Comments Independent, no dizziness   Gait   Gait Comments Pt ambulated 250 ft SBA progressing to independent with steady balance. States R knee limits his gait at times and is planned for TKA in Jan.   Stairs   Self Performance Independent   Physical/Nonphysical Assist: Stairs No set-up   Rails 1 rail   Indicate number of stairs 4   Balance   Balance Comments Balance steady without AD   Sensory Examination   Sensory Perception Comments Denies numbness or tingling   General Therapy Interventions   Intervention Comments None needed   Clinical Impression   Criteria for Skilled Therapeutic Intervention evaluation only   Clinical Presentation Stable/Uncomplicated   Clinical Presentation Rationale medically stable   Clinical Decision Making (Complexity) Low complexity   Predicted Duration of Therapy Intervention (days/wks) eval only   Anticipated Discharge Disposition Home   Risk & Benefits of therapy have been explained Yes   Patient, Family & other staff in agreement with plan of care Yes   [facility] [employer] TM \"6 Clicks\"   2016, Trustees of [facility], under license to [employer].  All rights reserved.   6 Clicks Short Forms Basic Mobility Inpatient Short Form   [facility] [employer]  \"6 Clicks\" V.2 Basic Mobility Inpatient Short Form   1. Turning from your back to your side while in a flat bed without using bedrails? 4 - None   2. Moving from lying on your back to sitting on the side of a flat bed " without using bedrails? 4 - None   3. Moving to and from a bed to a chair (including a wheelchair)? 4 - None   4. Standing up from a chair using your arms (e.g., wheelchair, or bedside chair)? 4 - None   5. To walk in hospital room? 4 - None   6. Climbing 3-5 steps with a railing? 4 - None   Basic Mobility Raw Score (Score out of 24.Lower scores equate to lower levels of function) 24   Total Evaluation Time   Total Evaluation Time (Minutes) 25

## 2019-11-23 NOTE — PROGRESS NOTES
RECEIVING UNIT ED HANDOFF REVIEW    ED Nurse Handoff Report was reviewed by: Josesito Cintron RN on November 22, 2019 at 8:52 PM

## 2019-11-23 NOTE — PLAN OF CARE
Goals to be met before discharge home:  1. diagnostic tests and consults completed and resulted: NOT MET  2. vital signs normal or at patient baseline: NOT MET  3. tolerating oral intake to maintain hydration: MET

## 2019-11-23 NOTE — PLAN OF CARE
PRIMARY DIAGNOSIS: VERTIGO    OUTPATIENT/OBSERVATION GOALS TO BE MET BEFORE DISCHARGE  1. Orthostatic performed: Yes:          Lying Orthostatic BP: 161/79         Sitting Orthostatic BP: 163/93         Standing Orthostatic BP: 166/91     2. Completion of appropriate imaging: Yes    3. Tolerating PO medications: Yes    4. Return to near baseline physical activity: Yes    5. Cleared for discharge by consultants (if involved): No    Discharge Planner Nurse   Safe discharge environment identified: Yes  Barriers to discharge: Yes       Entered by: Lennie Stoddard 11/23/2019 2:51 PM     Please review provider order for any additional goals.   Nurse to notify provider when observation goals have been met and patient is ready for discharge.

## 2019-11-23 NOTE — CONSULTS
Care Transition Initial Assessment - RN        Met with: Patient.  DATA   Active Problems:    Dizziness       Cognitive Status: awake, alert and oriented.        Contact information and PCP information verified: Yes, has a primary care MD in [place]  Lives With: spouse   Living Arrangements: house(one story)                 Insurance concerns: No Insurance issues identified  ASSESSMENT  Patient currently receives the following services:  none        Identified issues/concerns regarding health management: dizziness is improved      Home to daughter's home, cleared by PT, no equipment needed.  PLAN  Financial costs for the patient include per insurance .  Patient given options and choices for discharge n/a .  Patient/family is agreeable to the plan?  Yes:   Patient anticipates discharging to home .        Patient anticipates needs for home equipment: No  Transportation/person available to transport on day of discharge  is family and have they been notified/set up not yet  Plan/Disposition: Home   Appointments: see S      Care  (CTS) will continue to follow as needed.

## 2019-11-23 NOTE — PHARMACY-ADMISSION MEDICATION HISTORY
Pharmacy Medication History  Admission medication history interview status for the 11/22/2019  admission is complete. See EPIC admission navigator for prior to admission medications     Medication history sources: Patient, Reviewed [employer]   Medication history source reliability: Good  Adherence assessment: Good    Significant changes made to the medication list:  None      Additional medication history information:   None    Medication reconciliation completed by provider prior to medication history? No    Time spent in this activity: 10 minutes       Prior to Admission medications    Medication Sig Last Dose Taking? Auth Provider   lactobacillus rhamnosus, GG, (CULTURELL) capsule Take 1 capsule by mouth 2 times daily 11/22/2019 at am Yes Unknown, Entered By History   meclizine (ANTIVERT) 25 MG tablet Take 1 tablet (25 mg) by mouth 3 times daily as needed for dizziness 11/22/2019 at x2 Yes Luan Montaño,    valsartan-hydrochlorothiazide (DIOVAN HCT) 80-12.5 MG tablet Take 1 tablet by mouth daily 11/22/2019 at am Yes Reported, Patient

## 2019-11-23 NOTE — DISCHARGE SUMMARY
Discharge Summary  Hospitalist    Date of Admission:  11/22/2019  Date of Discharge:  11/23/2019  Discharging Provider: Laura Encarnacion MD    Primary Care Physician   Physician No Ref-Primary  Primary Care Provider Phone Number: None  Primary Care Provider Fax Number: [phone]    PRINCIPAL DIAGNOSIS  Dizziness, Vertigo improved.  Hypertension.  Accelerated hypertension on admission likely stress response.  Incidental ascending aortic dilatation.  Hyperglycemia likely stress response with hemoglobin A1c of 5.4.  Incidental MRI finding.     Medical problems   History of severe to profound high-frequency sensorineural hearing loss.  Senile nuclear cataract.  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  History of right carpal tunnel syndrome  Peyronie's disease  Erectile dysfunction.  Obesity with a BMI of 32.92.  Obstructive sleep apnea on CPAP.    Past Medical History:   Diagnosis Date     Hypertension        History of Present Illness   Harman Del Castillo is an 74 year old male who presented with room spinning dizziness.    Hospital Course   Harman Del Castillo is a 74 year old  male with medical history of hypertension, sensorineural hearing loss, senile cataract, history of chronic left shoulder pain due to nerve impingement, right carpal tunnel syndrome, KWABENA on CPAP, hypertension brought into the ED via EMS for room spinning dizziness.     Dizziness, Vertigo improved.  Patient reported dizziness on and off for the last 3 days.  Traveling from [place] to [place].  Felt like it is cloudy, far objects moving around,  room is spinning intermittently.  On exam no acute findings.  Received IV fluid bolus, Valium, Zofran in the ED, was admitted to observation unit and received gentle hydration for 10 hours.  Symptoms improved.  Orthostatic vital signs negative.    Electrolytes normal limits.  Creatinine 0.59.  8 ALT AST within normal limits.    CPK 43 TSH 0.86. WBC  10.5, hemoglobin 13.9, platelets 307.  Troponin x4 undetectable. Telemetry no acute events.  MRI brain no specific etiology of vertigo identified.  US carotids Less than 50% stenosis of the right internal carotid artery relative to the normal diameter internal carotid artery. Less than 50% stenosis of the left internal carotid artery relative to the normal diameter internal carotid artery.  Echocardiogram Left ventricular systolic function is normal. LVEF is estimated at 60-65%. RV is normal in structure, function and size.  Physical therapy recommended home with family support.  Patient ambulating around the unit independently, symptoms improved.  Discharged on as needed meclizine, as needed Zofran.  Recommend adequate oral hydration and adequate rest.  If symptoms ongoing consider follow-up with ENT, ophthalmology once back home..    Hypertension.  Accelerated hypertension on admission likely stress response.  In ED blood pressure 185/105 that improved to 138/81 without any antihypertensive regimen.   Received fluid bolus in the ED, given thousand mL fluid over 10 hours.  Continue PTA valsartan hydrochlorothiazide.  Recommend to monitor blood pressures at home and review on provider visit and optimize therapy.      Incidental ascending aortic dilatation.  Echocardiogram with the ascending aorta is Mildly dilated. Max diameter visualized 4.1 cm. IVC diameter and respiratory changes fall into an intermediate range suggesting an RA pressure of 8 mmHg.  No chest pain or shortness of breath at this time.  Monitor blood pressures at home and optimize antihypertensive therapy     Hyperglycemia likely stress response with hemoglobin A1c of 5.4.  Blood sugar on admission 207.    Monitor blood sugars periodically.     History of severe to profound high-frequency sensorineural hearing loss.  Has hearing aid and follows up with audiology team in [place].  Consider follow-up with the ENT once back home.     Senile nuclear  cataract.  Patient complained of blurred vision on morning of admission associated with dizziness.  MRI no acute stroke.  Symptoms improved during hospitalization.  Consider follow-up with ophthalmology as outpatient if ongoing symptoms.     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  No acute issues.    History of right carpal tunnel syndrome.  Follows up with neurology, orthopedic team in [place].  Underwent right carpal tunnel release on 11/7.  No acute issues, surgical site appears clean.     Incidental MRI finding.  MRI with opacified left maxillary sinus.  No sinus tenderness.  No symptoms of cough or cold.  No leukocytosis. Afebrile.  No need for antibiotics at this time.   Also noted on MRI small nodule in the right temporalis muscle consistent with a hemangioma, but not completely specific as to etiology. Follow up as outpatient.     Peyronie's disease  No acute issues.     Erectile dysfunction.  No acute issues.     Obesity with a BMI of 32.92.  We will need to consider lifestyle modification with diet and exercise as able to.     Obstructive sleep apnea on CPAP.  Patient home CPAP unavailable.  Emphasize compliance with CPAP.    Laura Encarnacion MD, MD    Pending Results   Unresulted Labs Ordered in the Past 30 Days of this Admission     No orders found for last 31 day(s).             Physical Exam   Vitals:    11/22/19 1613 11/22/19 2114   Weight: 107 kg (236 lb) 107.3 kg (236 lb 9.6 oz)     Vital Signs with Ranges  Temp:  [97.4  F (36.3  C)-98.4  F (36.9  C)] 98.2  F (36.8  C)  Pulse:  [67-90] 71  Resp:  [16-18] 16  BP: (138-185)/() 170/80  SpO2:  [94 %-98 %] 94 %  I/O last 3 completed shifts:  In: 240 [P.O.:240]  Out: 550 [Urine:550]  PHYSICAL EXAM  GENERAL: Patient is in no distress. Alert and oriented.  No neck stiffness.  Extraocular muscles movement intact.  HEART: Regular rate and rhythm. S1S2. No murmurs  LUNGS: Clear to auscultation  bilaterally. No expiratory wheeze.  Respirations unlabored  NEURO: Cranial nerves II-XII intact. Motor and sensory system in normal range  EXTREMITIES: No pedal edema.   SKIN: Warm, dry. No rash or bruising.  PSYCHIATRY Cooperative    )Consultations This Hospital Stay   SOCIAL WORK IP CONSULT  CARE COORDINATOR IP CONSULT  PHYSICAL THERAPY ADULT IP CONSULT    Time Spent on this Encounter   I, Laura Encarnacion MD, personally saw the patient today and spent greater than 30 minutes discharging this patient.    Discharge Orders      Reason for your hospital stay    You were admitted to the observation unit with dizziness, heart monitor no acute events, electrolytes, thyroid function test within normal limits.  Had extensive work-up including MRI, ultrasound carotids, echocardiogram with no acute findings.  Evaluated by physical therapy, recommend home with family support.     Follow-up and recommended labs and tests     Follow up with primary care provider, Physician No Ref-Primary, within 7 days for hospital follow- up.  The following labs/tests are recommended: Age-appropriate health maintenance on PCP visit.  Monitor blood sugars periodically.  If ongoing symptoms consider ENT evaluation.  Consider ophthalmology evaluation for cataract.  Follow-up incidental findings, MRI head with small nodule in the right temporalis muscle consistent with a hemangioma, but not completely specific as to etiology as outpatient.  Follow-up incidental ascending aortic dilatation [4.1 cm] as outpatient.     Activity    Your activity upon discharge: activity as tolerated and no driving for today     Monitor and record    Monitor blood pressures at home if able to and review on provider visit.     Discharge Instructions    Fall precautions.  Consider lifestyle modification with diet and exercise as able to.  Emphasize compliance with CPAP.     When to contact your care team    Seek medical attention if any worsening headache or dizziness.      Diet    Low-salt, low-fat diet as able to       Discharge Medications   Current Discharge Medication List      START taking these medications    Details   ondansetron (ZOFRAN-ODT) 4 MG ODT tab Take 1 tablet (4 mg) by mouth every 8 hours as needed for nausea or vomiting  Qty: 10 tablet, Refills: 0    Associated Diagnoses: Vertigo         CONTINUE these medications which have NOT CHANGED    Details   lactobacillus rhamnosus, GG, (CULTURELL) capsule Take 1 capsule by mouth 2 times daily      meclizine (ANTIVERT) 25 MG tablet Take 1 tablet (25 mg) by mouth 3 times daily as needed for dizziness  Qty: 21 tablet, Refills: 0    Associated Diagnoses: Vertigo      valsartan-hydrochlorothiazide (DIOVAN HCT) 80-12.5 MG tablet Take 1 tablet by mouth daily           Allergies   No Known Allergies    Discharge Disposition   Discharged to home  Condition at discharge: Stable    DATA  Most Recent 3 CBC's:  Recent Labs   Lab Test 11/22/19  1624 11/21/19  1021   WBC 10.5  --    HGB 13.9 13.6   MCV 94  --      --       Most Recent 3 BMP's:  Recent Labs   Lab Test 11/22/19  1624 11/21/19  1021    141   POTASSIUM 3.6 3.8   CHLORIDE 106 110*   CO2 24 28   BUN 21 22   CR 0.59* 0.73   ANIONGAP 9 3   RITA 8.4* 9.0   * 90     Most Recent 2 LFT's:  Recent Labs   Lab Test 11/23/19  0609   AST 17   ALT 21   ALKPHOS 88   BILITOTAL 0.4       Most Recent 3 Troponin's:  Recent Labs   Lab Test 11/23/19  0609 11/22/19  2142 11/22/19  1624   TROPI <0.015 <0.015 <0.015     Most Recent TSH, T4 and A1c Labs:  Recent Labs   Lab Test 11/23/19  0609 11/22/19  1624   TSH 0.86  --    A1C  --  5.4     Results for orders placed or performed during the hospital encounter of 11/22/19   MR Brain w/o & w Contrast    Narrative    MRI BRAIN WITHOUT AND WITH CONTRAST  11/22/2019 7:11 PM    HISTORY: Vertigo, worsening symptoms despite therapy.    TECHNIQUE: Multiplanar, multisequence MRI of the brain without and  with 11mL Gadavist.    COMPARISON:  None.    FINDINGS: There are a few small scattered foci of prolonged T2  relaxation in the central and periventricular white matter of the  cerebral hemispheres, nonspecific as to etiology. There is mild  generalized atrophy of the brain.  There is no evidence of hemorrhage,  mass, acute infarct, or anomaly.  There are no gadolinium enhancing  lesions.    Left maxillary sinuses are chronically opacified with mucosal  thickening and central fluid. There is scattered mucosal thickening  remaining paranasal sinuses. There is a small enhancing nodule in the  right temporalis muscle showing T1 hypointensity and T2 hyperintensity  consistent with a benign hemangioma. The arteries at the base of the  brain and the dural venous sinuses appear patent.       Impression    IMPRESSION:  1. No specific etiology of vertigo is identified.  2. Mild brain atrophy and mild white matter changes consistent with  sequelae of small vessel ischemic disease in the cerebral hemispheres.  3. Opacified left maxillary sinus.  4. Small nodule in the right temporalis muscle consistent with a  hemangioma, but not completely specific as to etiology.      VANIA CARROLL MD   US Carotid Bilateral Portable    Narrative    PROCEDURE:  Bilateral carotid doppler ultrasound    DATE OF PROCEDURE:   11/23/2019 8:50 AM    CLINICAL HISTORY/INDICATION:    dizziness    COMPARISON:  None relevant    TECHNIQUE:  Grayscale, color-flow and spectral waveform analysis with velocities  were performed of the bilateral carotid arteries.     FINDINGS:    Right:   Plaque: No significant plaques.    Right ICA Proximal PSV/EDV:  82/24  cm/sec.  Right ICA Mid PSV/EDV:  107/32 cm/sec.  Right ICA Dist PSV/EDV:  123/33 cm/sec.  Right ICA/CCA PSV Ratio:  On 0.5    These indicate less than 50% diameter stenosis of the right ICA.    Right Vertebral: antegrade flow  Right ECA: antegrade flow    Left:   Plaque: No significant plaques    Left ICA Proximal PSV/EDV: 120/25   cm/sec.  Left ICA Mid PSV/EDV:  99/30 cm/sec.  Left ICA Dist PSV/EDV: 108/34 cm/sec.  Left ICA/CCA PSV Ratio:  1.1    These indicate less than 50% diameter stenosis of the left ICA.    Left Vertebral: antegrade flow  Left ECA: antegrade flow      Impression    IMPRESSION:    1. Less than 50% stenosis of the right internal carotid artery  relative to the normal diameter internal carotid artery.  2. Less than 50% stenosis of the left internal carotid artery relative  to the normal diameter internal carotid artery..    Degree of stenosis measured relative to the diameter of the normal  internal carotid artery per NASCET or NASCET type criteria    <50% stenosis  -ICA PSV <125 cm/sec and plaque or intimal thickening is visible  sonographically.   -ICA/CCA PSV ratio <2.0 and ICA EDV < 40 cm/sec    50-69% stenosis  -ICA -230 cm/sec and plaque visible sonographically  -ICA/CCA PSC ratio 2.0-4.0 and ICA EDV of  cm/sec    70% or greater stenosis  -ICA PSV is >230 cm/sec and visible plaque and luminal narrowing are  seen at grayscale and color doppler  -ICA/CCA PSV ratio >4 and ICA EDV >100 cm/sec    LIZETH GUTIÉRREZ MD   Echocardiogram Complete    Narrative    [id number]  [id number]  [id number]  [id number]^CHRISTINA^ARAVIND           [facility]  Echocardiography Laboratory  [address] [place] [address]        Name: JEMIMA LANG  MRN: [id number]  : 1945  Study Date: 2019 09:59 AM  Age: 74 yrs  Gender: Male  Patient Location: [facility]  Reason For Study: Dizziness  Ordering Physician: ARAVIND VASQUEZ  Referring Physician: Kellen Ref-Primary, Physician  Performed By: Renetta Morrison     BSA: 2.3 m2  Height: 71 in  Weight: 236 lb  HR: 61  BP: 152/80 mmHg  _____________________________________________________________________________  __        Procedure  Complete Portable Echo Adult. Optison (NDC #5706-7886) given intravenously.  Complete Bubble Echo  Adult.  _____________________________________________________________________________  __        Interpretation Summary     Left ventricular systolic function is normal. LVEF is estimated at 60-65%.  The right ventricle is normal in structure, function and size.  The ascending aorta is Mildly dilated. Max diameter visualized 4.1 cm.  IVC diameter and respiratory changes fall into an intermediate range  suggesting an RA pressure of 8 mmHg.  There are no prior studies available for comparison.  _____________________________________________________________________________  __        Left Ventricle  The left ventricle is normal in structure, function and size. There is  concentric remodeling present. Left ventricular systolic function is normal.  The visual ejection fraction is estimated at 60-65%. Left ventricular  diastolic function is indeterminate. No regional wall motion abnormalities  noted.     Right Ventricle  The right ventricle is normal in structure, function and size.     Atria  The left atrium is mildly dilated. Right atrial size is normal. There is no  color Doppler evidence of an atrial shunt.     Mitral Valve  The mitral valve is normal in structure and function.        Tricuspid Valve  The tricuspid valve is not well visualized, but is grossly normal. There is  trace tricuspid regurgitation. The right ventricular systolic pressure is  approximated at 22mmHg plus the right atrial pressure. Right ventricular  systolic pressure is normal.     Aortic Valve  The aortic valve is normal in structure and function. The aortic valve is  trileaflet.     Pulmonic Valve  Normal pulmonic valve. This degree of valvular regurgitation is within normal  limits.     Vessels  Borderline aortic root dilatation. The ascending aorta is Mildly dilated. Max  diameter visualized 4.1 cm. IVC diameter and respiratory changes fall into an  intermediate range suggesting an RA pressure of 8 mmHg.     Pericardium  There is no  pericardial effusion.     _____________________________________________________________________________  __  MMode/2D Measurements & Calculations  IVSd: 1.3 cm  LVIDd: 5.0 cm  LVIDs: 3.3 cm  LVPWd: 1.3 cm  FS: 33.1 %  LV mass(C)d: 250.0 grams  LV mass(C)dI: 110.5 grams/m2     Ao root diam: 3.8 cm  LA dimension: 3.8 cm  asc Aorta Diam: 4.1 cm  LA/Ao: 0.99  LA Volume (BP): 81.8 ml  LA Volume Index (BP): 36.2 ml/m2  RWT: 0.51        Doppler Measurements & Calculations  MV E max ziggy: 49.0 cm/sec  MV A max ziggy: 52.8 cm/sec  MV E/A: 0.93  MV dec slope: 156.4 cm/sec2     PA acc time: 0.11 sec  TR max ziggy: 233.8 cm/sec  TR max P.9 mmHg  E/E' av.8  Lateral E/e': 5.8  Medial E/e': 5.9           _____________________________________________________________________________  __           Report approved by: Deon Parisi 2019 03:23 PM